# Patient Record
Sex: FEMALE | Race: WHITE | NOT HISPANIC OR LATINO | Employment: FULL TIME | URBAN - METROPOLITAN AREA
[De-identification: names, ages, dates, MRNs, and addresses within clinical notes are randomized per-mention and may not be internally consistent; named-entity substitution may affect disease eponyms.]

---

## 2017-01-18 ENCOUNTER — HOSPITAL ENCOUNTER (EMERGENCY)
Facility: HOSPITAL | Age: 27
Discharge: HOME | End: 2017-01-18
Attending: EMERGENCY MEDICINE | Admitting: EMERGENCY MEDICINE

## 2017-01-18 VITALS
HEART RATE: 80 BPM | WEIGHT: 134 LBS | DIASTOLIC BLOOD PRESSURE: 72 MMHG | RESPIRATION RATE: 16 BRPM | OXYGEN SATURATION: 98 % | TEMPERATURE: 98 F | HEIGHT: 63 IN | SYSTOLIC BLOOD PRESSURE: 109 MMHG | BODY MASS INDEX: 23.74 KG/M2

## 2017-01-18 DIAGNOSIS — N93.9 VAGINAL BLEEDING: Primary | ICD-10-CM

## 2017-01-18 LAB
ANION GAP SERPL CALCULATED.3IONS-SCNC: 11.5 MMOL/L
BASOPHILS # BLD AUTO: 0.03 10*3/MM3 (ref 0–0.2)
BASOPHILS NFR BLD AUTO: 0.4 % (ref 0–1.5)
BUN BLD-MCNC: 15 MG/DL (ref 6–20)
BUN/CREAT SERPL: 18.5 (ref 7–25)
CALCIUM SPEC-SCNC: 9.8 MG/DL (ref 8.6–10.5)
CHLORIDE SERPL-SCNC: 105 MMOL/L (ref 98–107)
CO2 SERPL-SCNC: 24.5 MMOL/L (ref 22–29)
CREAT BLD-MCNC: 0.81 MG/DL (ref 0.57–1)
DEPRECATED RDW RBC AUTO: 43.1 FL (ref 37–54)
EOSINOPHIL # BLD AUTO: 0.13 10*3/MM3 (ref 0–0.7)
EOSINOPHIL NFR BLD AUTO: 1.6 % (ref 0.3–6.2)
ERYTHROCYTE [DISTWIDTH] IN BLOOD BY AUTOMATED COUNT: 12.9 % (ref 11.7–13)
GFR SERPL CREATININE-BSD FRML MDRD: 87 ML/MIN/1.73
GLUCOSE BLD-MCNC: 102 MG/DL (ref 65–99)
HCG SERPL QL: NEGATIVE
HCT VFR BLD AUTO: 42.7 % (ref 35.6–45.5)
HGB BLD-MCNC: 13.6 G/DL (ref 11.9–15.5)
HOLD SPECIMEN: NORMAL
IMM GRANULOCYTES # BLD: 0 10*3/MM3 (ref 0–0.03)
IMM GRANULOCYTES NFR BLD: 0 % (ref 0–0.5)
LYMPHOCYTES # BLD AUTO: 4.5 10*3/MM3 (ref 0.9–4.8)
LYMPHOCYTES NFR BLD AUTO: 54.2 % (ref 19.6–45.3)
MCH RBC QN AUTO: 29.4 PG (ref 26.9–32)
MCHC RBC AUTO-ENTMCNC: 31.9 G/DL (ref 32.4–36.3)
MCV RBC AUTO: 92.2 FL (ref 80.5–98.2)
MONOCYTES # BLD AUTO: 0.47 10*3/MM3 (ref 0.2–1.2)
MONOCYTES NFR BLD AUTO: 5.7 % (ref 5–12)
NEUTROPHILS # BLD AUTO: 3.18 10*3/MM3 (ref 1.9–8.1)
NEUTROPHILS NFR BLD AUTO: 38.1 % (ref 42.7–76)
PLATELET # BLD AUTO: 215 10*3/MM3 (ref 140–500)
PMV BLD AUTO: 11.5 FL (ref 6–12)
POTASSIUM BLD-SCNC: 4.1 MMOL/L (ref 3.5–5.2)
RBC # BLD AUTO: 4.63 10*6/MM3 (ref 3.9–5.2)
SODIUM BLD-SCNC: 141 MMOL/L (ref 136–145)
WBC NRBC COR # BLD: 8.31 10*3/MM3 (ref 4.5–10.7)
WHOLE BLOOD HOLD SPECIMEN: NORMAL
WHOLE BLOOD HOLD SPECIMEN: NORMAL

## 2017-01-18 PROCEDURE — 80048 BASIC METABOLIC PNL TOTAL CA: CPT | Performed by: EMERGENCY MEDICINE

## 2017-01-18 PROCEDURE — 84703 CHORIONIC GONADOTROPIN ASSAY: CPT | Performed by: EMERGENCY MEDICINE

## 2017-01-18 PROCEDURE — 99284 EMERGENCY DEPT VISIT MOD MDM: CPT

## 2017-01-18 PROCEDURE — 85025 COMPLETE CBC W/AUTO DIFF WBC: CPT | Performed by: EMERGENCY MEDICINE

## 2017-01-18 RX ORDER — SODIUM CHLORIDE 0.9 % (FLUSH) 0.9 %
10 SYRINGE (ML) INJECTION AS NEEDED
Status: DISCONTINUED | OUTPATIENT
Start: 2017-01-18 | End: 2017-01-18 | Stop reason: HOSPADM

## 2017-01-18 NOTE — ED NOTES
Patient states she has had excessive vaginal bleeding for 5 days now, states she is not pregnant, but states she has not had a period in three months. Today she started to notice clots.      Silva Cottrell RN  01/18/17 0767

## 2017-01-18 NOTE — ED PROVIDER NOTES
EMERGENCY DEPARTMENT ENCOUNTER    CHIEF COMPLAINT  Chief Complaint: Vaginal bleeding  History given by: Patient  History limited by: n/a  Room Number:   PMD: No Known Provider      HPI:  Pt is a 26 y.o. female  who presents complaining of heavy vaginal bleeding onset 5 days ago. Pt reports the bleeding is heavier than a normal period. Pt reports her last normal period was 3 months ago. Pt admits dizziness, abdominal cramps, back pain. Pt denies SOB, CP, cough, dysuria. Pt is concerned for a miscarriage.    Pt was referred to ED by OB Dr. Bautista  Pt is not on birth control.    Duration:  5 days  Timing: constant  Location: vagina  Radiation: n/a  Quality: active, heavy  Intensity/Severity: moderate  Progression: worsening  Associated Symptoms: see ROS  Aggravating Factors: none  Alleviating Factors: none  Previous Episodes: none  Treatment before arrival: none    PAST MEDICAL HISTORY  Active Ambulatory Problems     Diagnosis Date Noted   • No Active Ambulatory Problems     Resolved Ambulatory Problems     Diagnosis Date Noted   • No Resolved Ambulatory Problems     No Additional Past Medical History       PAST SURGICAL HISTORY  History reviewed. No pertinent past surgical history.    FAMILY HISTORY  History reviewed. No pertinent family history.    SOCIAL HISTORY  Social History     Social History   • Marital status: Single     Spouse name: N/A   • Number of children: N/A   • Years of education: N/A     Occupational History   • Not on file.     Social History Main Topics   • Smoking status: Never Smoker   • Smokeless tobacco: Not on file   • Alcohol use Yes      Comment: occ   • Drug use: No   • Sexual activity: Not on file     Other Topics Concern   • Not on file     Social History Narrative   • No narrative on file       ALLERGIES  Review of patient's allergies indicates no known allergies.    REVIEW OF SYSTEMS  Review of Systems   Constitutional: Negative for fever.   Respiratory: Negative for cough  and shortness of breath.    Cardiovascular: Negative for chest pain.   Gastrointestinal: Positive for abdominal pain. Negative for nausea.   Genitourinary: Positive for vaginal bleeding. Negative for dysuria.   Musculoskeletal: Positive for back pain.   Skin: Negative for rash.   Neurological: Positive for dizziness.   All other systems reviewed and are negative.      PHYSICAL EXAM  ED Triage Vitals   Temp Heart Rate Resp BP SpO2   01/18/17 1653 01/18/17 1653 01/18/17 1730 01/18/17 1730 01/18/17 1653   97.9 °F (36.6 °C) 85 16 124/85 100 %      Temp src Heart Rate Source Patient Position BP Location FiO2 (%)   01/18/17 1653 01/18/17 1653 -- -- --   Tympanic Monitor          Physical Exam   Constitutional: She is oriented to person, place, and time.   HENT:   Head: Normocephalic and atraumatic.   Eyes: Pupils are equal, round, and reactive to light.   Cardiovascular: Normal rate and regular rhythm.    No murmur heard.  Pulmonary/Chest: Effort normal. No respiratory distress.   Abdominal: Soft. There is no tenderness. There is no rebound, no guarding and no CVA tenderness.   Musculoskeletal: She exhibits no edema.   Neurological: She is alert and oriented to person, place, and time.   Skin: Skin is warm and dry.   Nursing note and vitals reviewed.      LAB RESULTS  Lab Results (last 24 hours)     Procedure Component Value Units Date/Time    Basic Metabolic Panel [[id number]]  (Abnormal) Collected:  01/18/17 1807    Specimen:  Blood Updated:  01/18/17 1836     Glucose 102 (H) mg/dL      BUN 15 mg/dL      Creatinine 0.81 mg/dL      Sodium 141 mmol/L      Potassium 4.1 mmol/L      Chloride 105 mmol/L      CO2 24.5 mmol/L      Calcium 9.8 mg/dL      eGFR Non African Amer 87 mL/min/1.73      BUN/Creatinine Ratio 18.5      Anion Gap 11.5 mmol/L     Narrative:       GFR Normal >60  Chronic Kidney Disease <60  Kidney Failure <15    hCG, Serum, Qualitative [[id number]]  (Normal) Collected:  01/18/17 1807    Specimen:  Blood  Updated:  01/18/17 1900     HCG Qualitative Negative     CBC & Differential [[id number]] Collected:  01/18/17 1808    Specimen:  Blood Updated:  01/18/17 1829    Narrative:       The following orders were created for panel order CBC & Differential.  Procedure                               Abnormality         Status                     ---------                               -----------         ------                     CBC Auto Differential[[id number]]         Abnormal            Final result                 Please view results for these tests on the individual orders.    CBC Auto Differential [[id number]]  (Abnormal) Collected:  01/18/17 1808    Specimen:  Blood Updated:  01/18/17 1829     WBC 8.31 10*3/mm3      RBC 4.63 10*6/mm3      Hemoglobin 13.6 g/dL      Hematocrit 42.7 %      MCV 92.2 fL      MCH 29.4 pg      MCHC 31.9 (L) g/dL      RDW 12.9 %      RDW-SD 43.1 fl      MPV 11.5 fL      Platelets 215 10*3/mm3      Neutrophil % 38.1 (L) %      Lymphocyte % 54.2 (H) %      Monocyte % 5.7 %      Eosinophil % 1.6 %      Basophil % 0.4 %      Immature Grans % 0.0 %      Neutrophils, Absolute 3.18 10*3/mm3      Lymphocytes, Absolute 4.50 10*3/mm3      Monocytes, Absolute 0.47 10*3/mm3      Eosinophils, Absolute 0.13 10*3/mm3      Basophils, Absolute 0.03 10*3/mm3      Immature Grans, Absolute 0.00 10*3/mm3           I ordered the above labs and reviewed the results    RADIOLOGY  No orders to display        I ordered the above noted radiological studies. Interpreted by radiologist. Reviewed by me in PACS.       PROCEDURES  Procedures      PROGRESS AND CONSULTS  ED Course     1731  Ordered rainbow, BMP, hCG, CBC for further evaluation.    1900  Discussed pt's case and course of care with Dr. Maxwell (attending physician) who agrees to the plan.    1903  Placed call to OB/GYN for consultation.    1904  Rechecked pt. Pt is resting and appears well. Notified pt of unremarkable labs and imaging. Discussed the plan to consult  with Dr. Bautista. Pt and family agree.    1919  Discussed case with Dr Tenorio (OB/GYN)  Reviewed history, exam, results and treatments.  Discussed concerns and plan of care. Dr Tenorio agrees with the plan of discharge and will let Dr. Bautista know of the results and have pt f/u in the office.    MEDICAL DECISION MAKING  Results were reviewed/discussed with the patient and they were also made aware of online access. Pt also made aware that some labs, such as cultures, will not be resulted during ER visit and follow up with PMD is necessary.     MDM  Number of Diagnoses or Management Options  Vaginal bleeding:      Amount and/or Complexity of Data Reviewed  Clinical lab tests: reviewed and ordered  Decide to obtain previous medical records or to obtain history from someone other than the patient: yes  Discuss the patient with other providers: yes (D/w Dr. Tenorio)           DIAGNOSIS  Final diagnoses:   Vaginal bleeding       DISPOSITION  DISCHARGE    Patient discharged in stable condition.    Reviewed implications of results, diagnosis, meds, responsibility to follow up, warning signs and symptoms of possible worsening, potential complications and reasons to return to ER, including worsening bleeding.    Patient/Family voiced understanding of above instructions.    Discussed plan for discharge, as there is no emergent indication for admission.  Pt/family is agreeable and understands need for follow up and repeat testing.  Pt is aware that discharge does not mean that nothing is wrong but it indicates no emergency is present that requires admission and they must continue care with follow-up as given below or physician of their choice.     FOLLOW-UP  Matt Bautista MD  [address] [address]  [phone]    Schedule an appointment as soon as possible for a visit      Latest Documented Vital Signs:  As of 7:21 PM  BP- 123/87 HR- 84 Temp- 98.2 °F (36.8 °C) (Tympanic) O2 sat-  99%    --  Documentation assistance provided by zaira Lang for SEDRICK Powell.  Information recorded by the scribe was done at my direction and has been verified and validated by me.          González Lang  01/18/17 1927       SEDRICK Santiago  01/18/17 1937

## 2017-01-19 LAB — HOLD SPECIMEN: NORMAL

## 2017-01-19 NOTE — ED PROVIDER NOTES
27 y/o female presents to the ED with vaginal bleeding onset today. Pt states her LMP was 3 months ago.     Limited Exam:  Awake, alert, in NAD  Abd benign  Radial pulses strong and equal bilat.      PLAN:  D/w pt and family labs which showed no serious abnormalities. HCG negative.   Decision to d/c was discussed. Pt understands and agrees with plan of care, all questions and concerns addressed.         I supervised care provided by the midlevel provider.    We have discussed this patient's history, physical exam, and treatment plan.   I have reviewed the note and personally saw and examined the patient and agree with the plan of care.       Juan Carpio  01/18/17 1950       Kale Maxwell MD  01/18/17 9538